# Patient Record
Sex: MALE | Race: WHITE | Employment: PART TIME | URBAN - METROPOLITAN AREA
[De-identification: names, ages, dates, MRNs, and addresses within clinical notes are randomized per-mention and may not be internally consistent; named-entity substitution may affect disease eponyms.]

---

## 2017-02-07 ENCOUNTER — HOSPITAL ENCOUNTER (OUTPATIENT)
Dept: MRI IMAGING | Age: 62
Discharge: HOME | End: 2017-02-07
Payer: MEDICARE

## 2017-02-07 DIAGNOSIS — I70.1 RENAL ARTERY STENOSIS (HCC): ICD-10-CM

## 2017-02-07 DIAGNOSIS — I10 UNCONTROLLED HYPERTENSION: ICD-10-CM

## 2017-02-07 PROCEDURE — 6360000004 HC RX CONTRAST MEDICATION: Performed by: FAMILY MEDICINE

## 2017-02-07 PROCEDURE — 74185 MRA ABD W OR W/O CNTRST: CPT | Performed by: RADIOLOGY

## 2017-02-07 PROCEDURE — A9579 GAD-BASE MR CONTRAST NOS,1ML: HCPCS | Performed by: FAMILY MEDICINE

## 2017-02-07 PROCEDURE — C8902 MRA W/O FOL W/CONT, ABD: HCPCS

## 2017-02-07 RX ADMIN — GADOPENTETATE DIMEGLUMINE 40 ML: 469.01 INJECTION INTRAVENOUS at 18:54

## 2017-10-06 ENCOUNTER — TELEPHONE (OUTPATIENT)
Dept: ADMINISTRATIVE | Age: 62
End: 2017-10-06

## 2018-06-28 ENCOUNTER — HOSPITAL ENCOUNTER (INPATIENT)
Age: 63
LOS: 2 days | Discharge: HOME | DRG: 312 | End: 2018-06-30
Attending: EMERGENCY MEDICINE | Admitting: FAMILY MEDICINE
Payer: MEDICARE

## 2018-06-28 ENCOUNTER — APPOINTMENT (OUTPATIENT)
Dept: GENERAL RADIOLOGY | Age: 63
DRG: 312 | End: 2018-06-28
Payer: MEDICARE

## 2018-06-28 DIAGNOSIS — R42 DIZZINESS: Primary | ICD-10-CM

## 2018-06-28 DIAGNOSIS — R55 PRE-SYNCOPE: ICD-10-CM

## 2018-06-28 DIAGNOSIS — R00.1 SINUS BRADYCARDIA: ICD-10-CM

## 2018-06-28 LAB
ABSOLUTE EOS #: 0.3 K/UL (ref 0–0.4)
ABSOLUTE IMMATURE GRANULOCYTE: ABNORMAL K/UL (ref 0–0.3)
ABSOLUTE LYMPH #: 2.9 K/UL (ref 1–4.8)
ABSOLUTE MONO #: 0.7 K/UL (ref 0.1–1.3)
ANION GAP SERPL CALCULATED.3IONS-SCNC: 15 MMOL/L (ref 9–17)
BASOPHILS # BLD: 2 % (ref 0–2)
BASOPHILS ABSOLUTE: 0.2 K/UL (ref 0–0.2)
BNP INTERPRETATION: ABNORMAL
BUN BLDV-MCNC: 17 MG/DL (ref 8–23)
BUN/CREAT BLD: ABNORMAL (ref 9–20)
CALCIUM SERPL-MCNC: 10.4 MG/DL (ref 8.6–10.4)
CHLORIDE BLD-SCNC: 101 MMOL/L (ref 98–107)
CO2: 24 MMOL/L (ref 20–31)
CREAT SERPL-MCNC: 0.82 MG/DL (ref 0.7–1.2)
DIFFERENTIAL TYPE: ABNORMAL
EOSINOPHILS RELATIVE PERCENT: 4 % (ref 0–4)
GFR AFRICAN AMERICAN: >60 ML/MIN
GFR NON-AFRICAN AMERICAN: >60 ML/MIN
GFR SERPL CREATININE-BSD FRML MDRD: ABNORMAL ML/MIN/{1.73_M2}
GFR SERPL CREATININE-BSD FRML MDRD: ABNORMAL ML/MIN/{1.73_M2}
GLUCOSE BLD-MCNC: 92 MG/DL (ref 70–99)
HCT VFR BLD CALC: 40.6 % (ref 41–53)
HEMOGLOBIN: 13.7 G/DL (ref 13.5–17.5)
IMMATURE GRANULOCYTES: ABNORMAL %
LYMPHOCYTES # BLD: 33 % (ref 24–44)
MAGNESIUM: 2.1 MG/DL (ref 1.6–2.6)
MCH RBC QN AUTO: 31.2 PG (ref 26–34)
MCHC RBC AUTO-ENTMCNC: 33.7 G/DL (ref 31–37)
MCV RBC AUTO: 92.5 FL (ref 80–100)
MONOCYTES # BLD: 8 % (ref 1–7)
NRBC AUTOMATED: ABNORMAL PER 100 WBC
PDW BLD-RTO: 13.2 % (ref 11.5–14.9)
PLATELET # BLD: 353 K/UL (ref 150–450)
PLATELET ESTIMATE: ABNORMAL
PMV BLD AUTO: 8.4 FL (ref 6–12)
POTASSIUM SERPL-SCNC: 3.4 MMOL/L (ref 3.7–5.3)
PRO-BNP: 304 PG/ML
RBC # BLD: 4.38 M/UL (ref 4.5–5.9)
RBC # BLD: ABNORMAL 10*6/UL
SEG NEUTROPHILS: 53 % (ref 36–66)
SEGMENTED NEUTROPHILS ABSOLUTE COUNT: 4.8 K/UL (ref 1.3–9.1)
SODIUM BLD-SCNC: 140 MMOL/L (ref 135–144)
TROPONIN INTERP: NORMAL
TROPONIN INTERP: NORMAL
TROPONIN T: <0.03 NG/ML
TROPONIN T: <0.03 NG/ML
WBC # BLD: 8.9 K/UL (ref 3.5–11)
WBC # BLD: ABNORMAL 10*3/UL

## 2018-06-28 PROCEDURE — 71046 X-RAY EXAM CHEST 2 VIEWS: CPT

## 2018-06-28 PROCEDURE — 85025 COMPLETE CBC W/AUTO DIFF WBC: CPT

## 2018-06-28 PROCEDURE — 36415 COLL VENOUS BLD VENIPUNCTURE: CPT

## 2018-06-28 PROCEDURE — G0378 HOSPITAL OBSERVATION PER HR: HCPCS

## 2018-06-28 PROCEDURE — 6360000002 HC RX W HCPCS: Performed by: FAMILY MEDICINE

## 2018-06-28 PROCEDURE — 2060000000 HC ICU INTERMEDIATE R&B

## 2018-06-28 PROCEDURE — 93005 ELECTROCARDIOGRAM TRACING: CPT

## 2018-06-28 PROCEDURE — 80048 BASIC METABOLIC PNL TOTAL CA: CPT

## 2018-06-28 PROCEDURE — 83735 ASSAY OF MAGNESIUM: CPT

## 2018-06-28 PROCEDURE — 84484 ASSAY OF TROPONIN QUANT: CPT

## 2018-06-28 PROCEDURE — 2580000003 HC RX 258: Performed by: FAMILY MEDICINE

## 2018-06-28 PROCEDURE — 6370000000 HC RX 637 (ALT 250 FOR IP): Performed by: FAMILY MEDICINE

## 2018-06-28 PROCEDURE — 99285 EMERGENCY DEPT VISIT HI MDM: CPT

## 2018-06-28 PROCEDURE — 2580000003 HC RX 258: Performed by: EMERGENCY MEDICINE

## 2018-06-28 PROCEDURE — 83880 ASSAY OF NATRIURETIC PEPTIDE: CPT

## 2018-06-28 RX ORDER — ASPIRIN 81 MG/1
81 TABLET ORAL DAILY
Status: DISCONTINUED | OUTPATIENT
Start: 2018-06-28 | End: 2018-06-30 | Stop reason: HOSPADM

## 2018-06-28 RX ORDER — CARVEDILOL 25 MG/1
25 TABLET ORAL NIGHTLY
Status: DISCONTINUED | OUTPATIENT
Start: 2018-06-28 | End: 2018-06-30 | Stop reason: HOSPADM

## 2018-06-28 RX ORDER — OXYCODONE HYDROCHLORIDE AND ACETAMINOPHEN 5; 325 MG/1; MG/1
1 TABLET ORAL EVERY 4 HOURS PRN
Status: DISCONTINUED | OUTPATIENT
Start: 2018-06-28 | End: 2018-06-30 | Stop reason: HOSPADM

## 2018-06-28 RX ORDER — SODIUM CHLORIDE 0.9 % (FLUSH) 0.9 %
10 SYRINGE (ML) INJECTION PRN
Status: DISCONTINUED | OUTPATIENT
Start: 2018-06-28 | End: 2018-06-28 | Stop reason: SDUPTHER

## 2018-06-28 RX ORDER — FUROSEMIDE 20 MG/1
20 TABLET ORAL DAILY
Status: DISCONTINUED | OUTPATIENT
Start: 2018-06-29 | End: 2018-06-29

## 2018-06-28 RX ORDER — SODIUM CHLORIDE 9 MG/ML
INJECTION, SOLUTION INTRAVENOUS CONTINUOUS
Status: DISCONTINUED | OUTPATIENT
Start: 2018-06-28 | End: 2018-06-29

## 2018-06-28 RX ORDER — DILTIAZEM HYDROCHLORIDE 180 MG/1
180 CAPSULE, COATED, EXTENDED RELEASE ORAL 2 TIMES DAILY
Status: DISCONTINUED | OUTPATIENT
Start: 2018-06-28 | End: 2018-06-28

## 2018-06-28 RX ORDER — DULOXETIN HYDROCHLORIDE 60 MG/1
60 CAPSULE, DELAYED RELEASE ORAL 2 TIMES DAILY
Status: DISCONTINUED | OUTPATIENT
Start: 2018-06-28 | End: 2018-06-30 | Stop reason: HOSPADM

## 2018-06-28 RX ORDER — HYDRALAZINE HYDROCHLORIDE 20 MG/ML
10 INJECTION INTRAMUSCULAR; INTRAVENOUS EVERY 6 HOURS PRN
Status: DISCONTINUED | OUTPATIENT
Start: 2018-06-28 | End: 2018-06-30 | Stop reason: HOSPADM

## 2018-06-28 RX ORDER — SODIUM CHLORIDE 0.9 % (FLUSH) 0.9 %
10 SYRINGE (ML) INJECTION EVERY 12 HOURS SCHEDULED
Status: DISCONTINUED | OUTPATIENT
Start: 2018-06-28 | End: 2018-06-30 | Stop reason: HOSPADM

## 2018-06-28 RX ORDER — CLONAZEPAM 1 MG/1
1 TABLET ORAL 2 TIMES DAILY
Status: DISCONTINUED | OUTPATIENT
Start: 2018-06-28 | End: 2018-06-28

## 2018-06-28 RX ORDER — SODIUM CHLORIDE 0.9 % (FLUSH) 0.9 %
10 SYRINGE (ML) INJECTION PRN
Status: DISCONTINUED | OUTPATIENT
Start: 2018-06-28 | End: 2018-06-30 | Stop reason: HOSPADM

## 2018-06-28 RX ORDER — CARVEDILOL 25 MG/1
25 TABLET ORAL NIGHTLY
COMMUNITY

## 2018-06-28 RX ORDER — 0.9 % SODIUM CHLORIDE 0.9 %
1000 INTRAVENOUS SOLUTION INTRAVENOUS ONCE
Status: COMPLETED | OUTPATIENT
Start: 2018-06-28 | End: 2018-06-28

## 2018-06-28 RX ORDER — SODIUM CHLORIDE 0.9 % (FLUSH) 0.9 %
10 SYRINGE (ML) INJECTION EVERY 12 HOURS SCHEDULED
Status: DISCONTINUED | OUTPATIENT
Start: 2018-06-28 | End: 2018-06-28 | Stop reason: SDUPTHER

## 2018-06-28 RX ORDER — HYDRALAZINE HYDROCHLORIDE 50 MG/1
50 TABLET, FILM COATED ORAL 3 TIMES DAILY
Status: DISCONTINUED | OUTPATIENT
Start: 2018-06-28 | End: 2018-06-30 | Stop reason: HOSPADM

## 2018-06-28 RX ORDER — ONDANSETRON 2 MG/ML
4 INJECTION INTRAMUSCULAR; INTRAVENOUS EVERY 6 HOURS PRN
Status: DISCONTINUED | OUTPATIENT
Start: 2018-06-28 | End: 2018-06-28 | Stop reason: SDUPTHER

## 2018-06-28 RX ORDER — ONDANSETRON 2 MG/ML
4 INJECTION INTRAMUSCULAR; INTRAVENOUS EVERY 6 HOURS PRN
Status: DISCONTINUED | OUTPATIENT
Start: 2018-06-28 | End: 2018-06-30 | Stop reason: HOSPADM

## 2018-06-28 RX ORDER — CARVEDILOL 25 MG/1
25 TABLET ORAL 2 TIMES DAILY WITH MEALS
Status: DISCONTINUED | OUTPATIENT
Start: 2018-06-28 | End: 2018-06-28

## 2018-06-28 RX ADMIN — SODIUM CHLORIDE 1000 ML: 9 INJECTION, SOLUTION INTRAVENOUS at 11:24

## 2018-06-28 RX ADMIN — HYDRALAZINE HYDROCHLORIDE 50 MG: 50 TABLET, FILM COATED ORAL at 20:25

## 2018-06-28 RX ADMIN — Medication 10 ML: at 20:24

## 2018-06-28 RX ADMIN — SODIUM CHLORIDE: 9 INJECTION, SOLUTION INTRAVENOUS at 20:27

## 2018-06-28 RX ADMIN — DULOXETINE HYDROCHLORIDE 60 MG: 60 CAPSULE, DELAYED RELEASE ORAL at 20:25

## 2018-06-28 RX ADMIN — HYDRALAZINE HYDROCHLORIDE 50 MG: 50 TABLET, FILM COATED ORAL at 17:24

## 2018-06-28 RX ADMIN — ENOXAPARIN SODIUM 30 MG: 30 INJECTION SUBCUTANEOUS at 20:24

## 2018-06-28 ASSESSMENT — ENCOUNTER SYMPTOMS
CONSTIPATION: 0
COUGH: 0
WHEEZING: 0
SHORTNESS OF BREATH: 0
EYE DISCHARGE: 0
BLURRED VISION: 0
VOMITING: 0
ABDOMINAL PAIN: 0
SORE THROAT: 0
NAUSEA: 0
DIARRHEA: 0

## 2018-06-28 NOTE — ED PROVIDER NOTES
for further workup. Patient reports already having a echocardiogram as well as carotid Dopplers ordered as an outpatient by his primary physician. DIAGNOSTIC RESULTS     EKG: All EKG's are interpreted by the Emergency Department Physician who either signs or Co-signs this chart in the absence of a cardiologist.    EKG Interpretation    Interpreted by me-11:00 AM     Rhythm: normal sinus   Rate: Bradycardic, 52  Axis: normal  Ectopy: none  Conduction: normal  ST Segments: no acute change  T Waves: no acute change  Q Waves: Nonspecific, leads 1, 2, aVL    Clinical Impression: Sinus bradycardia, nonspecific EKG    RADIOLOGY:   I directly visualized the following  images and reviewed the radiologist interpretations:  XR CHEST STANDARD (2 VW)   Final Result   No evidence for acute cardiopulmonary pathology. ED BEDSIDE ULTRASOUND:      LABS:  Labs Reviewed   CBC WITH AUTO DIFFERENTIAL - Abnormal; Notable for the following:        Result Value    RBC 4.38 (*)     Hematocrit 40.6 (*)     Monocytes 8 (*)     All other components within normal limits   BASIC METABOLIC PANEL - Abnormal; Notable for the following:     Potassium 3.4 (*)     All other components within normal limits   BRAIN NATRIURETIC PEPTIDE - Abnormal; Notable for the following:     Pro- (*)     All other components within normal limits   TROPONIN   MAGNESIUM   TROPONIN         EMERGENCY DEPARTMENT COURSE:   Vitals:    Vitals:    06/28/18 1053 06/28/18 1109 06/28/18 1150   BP: (!) 178/76  (!) 155/65   Pulse: 54 56 55   Resp: 16  16   Temp: 98.1 °F (36.7 °C)     TempSrc: Oral     SpO2: 99%  98%   Weight: 262 lb (118.8 kg)     Height: 5' 11\" (1.803 m)       12:34 PM  Workup is unremarkable. Patient remains in sinus bradycardia. Remain slightly dizzy and lightheaded with the sensation of palpitations. No PVCs noted on monitor or on EKG.   Spoke with Dr. Leif Fleming who recommended orthostatic vital signs as stated he would see the patient later today. Spoke with Dr. Satish Prado who will admit patient for symptomatic bradycardia. CRITICAL CARE:      CONSULTS:  IP CONSULT TO PRIMARY CARE PROVIDER  IP CONSULT TO CARDIOLOGY      PROCEDURES:      FINAL IMPRESSION      1. Dizziness    2. Sinus bradycardia    3.  Pre-syncope            DISPOSITION/PLAN   DISPOSITION      Admission      PATIENT REFERRED TO:  Lorna Sherman [address]  [phone]            DISCHARGE MEDICATIONS:  New Prescriptions    No medications on file       (Please note that portions of this note were completed with a voice recognition program.  Efforts were made to edit the dictations but occasionally words are mis-transcribed.)    Clay Garvey DO  Attending Emergency Physician          Clay Garvey DO  06/28/18 0746

## 2018-06-29 ENCOUNTER — APPOINTMENT (OUTPATIENT)
Dept: MRI IMAGING | Age: 63
DRG: 312 | End: 2018-06-29
Payer: MEDICARE

## 2018-06-29 LAB
ANION GAP SERPL CALCULATED.3IONS-SCNC: 12 MMOL/L (ref 9–17)
BUN BLDV-MCNC: 11 MG/DL (ref 8–23)
BUN/CREAT BLD: ABNORMAL (ref 9–20)
CALCIUM IONIZED: 1.23 MMOL/L (ref 1.13–1.33)
CALCIUM SERPL-MCNC: 8.5 MG/DL (ref 8.6–10.4)
CHLORIDE BLD-SCNC: 103 MMOL/L (ref 98–107)
CO2: 27 MMOL/L (ref 20–31)
CREAT SERPL-MCNC: 0.85 MG/DL (ref 0.7–1.2)
GFR AFRICAN AMERICAN: >60 ML/MIN
GFR NON-AFRICAN AMERICAN: >60 ML/MIN
GFR SERPL CREATININE-BSD FRML MDRD: ABNORMAL ML/MIN/{1.73_M2}
GFR SERPL CREATININE-BSD FRML MDRD: ABNORMAL ML/MIN/{1.73_M2}
GLUCOSE BLD-MCNC: 121 MG/DL (ref 70–99)
LV EF: 55 %
LVEF MODALITY: NORMAL
MAGNESIUM: 2.2 MG/DL (ref 1.6–2.6)
POTASSIUM SERPL-SCNC: 3.5 MMOL/L (ref 3.7–5.3)
SODIUM BLD-SCNC: 142 MMOL/L (ref 135–144)

## 2018-06-29 PROCEDURE — G0378 HOSPITAL OBSERVATION PER HR: HCPCS

## 2018-06-29 PROCEDURE — 80048 BASIC METABOLIC PNL TOTAL CA: CPT

## 2018-06-29 PROCEDURE — 36415 COLL VENOUS BLD VENIPUNCTURE: CPT

## 2018-06-29 PROCEDURE — 94660 CPAP INITIATION&MGMT: CPT

## 2018-06-29 PROCEDURE — 6360000004 HC RX CONTRAST MEDICATION: Performed by: PSYCHIATRY & NEUROLOGY

## 2018-06-29 PROCEDURE — 6360000002 HC RX W HCPCS: Performed by: FAMILY MEDICINE

## 2018-06-29 PROCEDURE — 93005 ELECTROCARDIOGRAM TRACING: CPT

## 2018-06-29 PROCEDURE — 93880 EXTRACRANIAL BILAT STUDY: CPT

## 2018-06-29 PROCEDURE — 6370000000 HC RX 637 (ALT 250 FOR IP): Performed by: FAMILY MEDICINE

## 2018-06-29 PROCEDURE — A9579 GAD-BASE MR CONTRAST NOS,1ML: HCPCS | Performed by: PSYCHIATRY & NEUROLOGY

## 2018-06-29 PROCEDURE — 70553 MRI BRAIN STEM W/O & W/DYE: CPT

## 2018-06-29 PROCEDURE — 93306 TTE W/DOPPLER COMPLETE: CPT

## 2018-06-29 PROCEDURE — 2060000000 HC ICU INTERMEDIATE R&B

## 2018-06-29 PROCEDURE — 2580000003 HC RX 258: Performed by: FAMILY MEDICINE

## 2018-06-29 PROCEDURE — 82330 ASSAY OF CALCIUM: CPT

## 2018-06-29 PROCEDURE — 83735 ASSAY OF MAGNESIUM: CPT

## 2018-06-29 PROCEDURE — 94762 N-INVAS EAR/PLS OXIMTRY CONT: CPT

## 2018-06-29 PROCEDURE — 6360000002 HC RX W HCPCS: Performed by: INTERNAL MEDICINE

## 2018-06-29 RX ORDER — LEVETIRACETAM 500 MG/1
500 TABLET ORAL NIGHTLY
Status: DISCONTINUED | OUTPATIENT
Start: 2018-06-29 | End: 2018-06-30 | Stop reason: HOSPADM

## 2018-06-29 RX ORDER — POTASSIUM CHLORIDE 7.45 MG/ML
10 INJECTION INTRAVENOUS PRN
Status: DISCONTINUED | OUTPATIENT
Start: 2018-06-29 | End: 2018-06-30 | Stop reason: HOSPADM

## 2018-06-29 RX ORDER — POTASSIUM CHLORIDE 20MEQ/15ML
40 LIQUID (ML) ORAL PRN
Status: DISCONTINUED | OUTPATIENT
Start: 2018-06-29 | End: 2018-06-30 | Stop reason: HOSPADM

## 2018-06-29 RX ORDER — POTASSIUM CHLORIDE 20 MEQ/1
40 TABLET, EXTENDED RELEASE ORAL PRN
Status: DISCONTINUED | OUTPATIENT
Start: 2018-06-29 | End: 2018-06-30 | Stop reason: HOSPADM

## 2018-06-29 RX ADMIN — HYDRALAZINE HYDROCHLORIDE 10 MG: 20 INJECTION INTRAMUSCULAR; INTRAVENOUS at 18:35

## 2018-06-29 RX ADMIN — SODIUM CHLORIDE: 9 INJECTION, SOLUTION INTRAVENOUS at 04:46

## 2018-06-29 RX ADMIN — POTASSIUM CHLORIDE 40 MEQ: 1500 TABLET, EXTENDED RELEASE ORAL at 08:33

## 2018-06-29 RX ADMIN — Medication 10 ML: at 19:50

## 2018-06-29 RX ADMIN — ENOXAPARIN SODIUM 30 MG: 30 INJECTION SUBCUTANEOUS at 19:50

## 2018-06-29 RX ADMIN — HYDRALAZINE HYDROCHLORIDE 10 MG: 20 INJECTION INTRAMUSCULAR; INTRAVENOUS at 07:20

## 2018-06-29 RX ADMIN — GADOTERIDOL 20 ML: 279.3 INJECTION, SOLUTION INTRAVENOUS at 15:20

## 2018-06-29 RX ADMIN — ENOXAPARIN SODIUM 30 MG: 30 INJECTION SUBCUTANEOUS at 08:21

## 2018-06-29 RX ADMIN — OXYCODONE HYDROCHLORIDE AND ACETAMINOPHEN 1 TABLET: 5; 325 TABLET ORAL at 16:52

## 2018-06-29 ASSESSMENT — PAIN SCALES - GENERAL: PAINLEVEL_OUTOF10: 4

## 2018-06-29 NOTE — H&P
Dr. Warren Tvoar                                                                       Admission Note/H&P           Patient:  Erinn Reid  YOB: 1955     MRN: [id number]                            Acct: [de-identified]      Admit date: 6/28/2018     Pt seen and Chart reviewed. Consultant notes reviewed and outpatient/ ED care evaluated.     Subjective: sees cardiol and neurol  Event monitor on 6/27, picked up event s/s syncopal like per pt  Seen neurol, has testing ordered for July ? ?EEG and others? ?  Absence events, syncope,           Patient Active Problem List   Diagnosis Code    Syncope and collapse R55         Diet:  DIET CARB CONTROL; No Added Salt (3-4 GM)        Medications:Current Inpatient     Scheduled Meds:  Scheduled Medications    aspirin  81 mg Oral Daily    DULoxetine  60 mg Oral BID    furosemide  20 mg Oral Daily    hydrALAZINE  50 mg Oral TID    sodium chloride flush  10 mL Intravenous 2 times per day    enoxaparin  30 mg Subcutaneous BID    carvedilol  25 mg Oral Nightly         Continuous Infusions:  Infusions Meds    sodium chloride 125 mL/hr at 06/29/18 0446         PRN Meds:  PRN Medications   ondansetron, oxyCODONE-acetaminophen, sodium chloride flush, magnesium hydroxide, hydrALAZINE        Objective:     Physical Exam:  Vitals: BP (!) 192/86   Pulse 57   Temp 98 °F (36.7 °C) (Axillary)   Resp 18   Ht 5' 11\" (1.803 m)   Wt 265 lb (120.2 kg)   SpO2 99%   BMI 36.96 kg/m²   Height and weight:    Wt Readings from Last 3 Encounters:   06/28/18 265 lb (120.2 kg)   10/13/16 287 lb (130.2 kg)   10/13/16 287 lb (130.2 kg)      [unfilled]     Physical Examination:   General appearance - alert, well appearing, and in no distress  Mental status - alert, oriented to person, place, and time  Chest - clear to auscultation, no wheezes, rales or rhonchi, symmetric air entry  Heart - normal rate, regular rhythm,

## 2018-06-29 NOTE — PROGRESS NOTES
Bipap on standby at this time. Pulse Ox-- 969%   Skin score--o      Nasal gel pad available at bedside. Pt awake/alert/no distress noted.

## 2018-06-29 NOTE — PROGRESS NOTES
organomegaly  Neurological - alert, oriented, normal speech, no focal findings or movement disorder noted}  Extremities - peripheral pulses normal, no pedal edema, no clubbing or cyanosis  Skin - normal coloration and turgor, no rashes, no suspicious skin lesions noted       Labs:-    CBC:   Recent Labs      06/28/18   1115   WBC  8.9   HGB  13.7   PLT  353     BMP:    Recent Labs      06/28/18   1115  06/29/18   0500   NA  140  142   K  3.4*  3.5*   CL  101  103   CO2  24  27   BUN  17  11   CREATININE  0.82  0.85   GLUCOSE  92  121*     Glucose:No results for input(s): POCGLU in the last 72 hours. HgbA1C: No results for input(s): LABA1C in the last 72 hours. INR: No results for input(s): INR in the last 72 hours. CARDIAC ENZYMES:No results for input(s): CKTOTAL, CKMB, CKMBINDEX, TROPONINI in the last 72 hours. BNP: No results for input(s): BNP in the last 72 hours. Lipids: No results for input(s): CHOL, TRIG, HDL, LDLCALC in the last 72 hours.     Invalid input(s): LDL  ABGs: No results found for: PH, PCO2, PO2, HCO3, O2SAT  Thyroid:   Lab Results   Component Value Date    TSH 2.28 01/23/2013      Urinalysis:   Clarity, UA   Date Value Ref Range Status   11/03/2016 clear  Final     Color, UA   Date Value Ref Range Status   11/03/2016 yellow  Final   09/15/2016 YELLOW YEL Final     pH, UA   Date Value Ref Range Status   11/03/2016 -  Final   09/15/2016 5.5 5.0 - 8.0 Final     Specific Gravity, UA   Date Value Ref Range Status   09/15/2016 1.027 1.000 - 1.030 Final     Spec Grav, UA   Date Value Ref Range Status   11/03/2016 -  Final     Protein, UA   Date Value Ref Range Status   09/15/2016 NEGATIVE NEG Final     Protein, UA POC   Date Value Ref Range Status   11/03/2016 neg  Final     Blood, UA POC   Date Value Ref Range Status   11/03/2016 neg  Final     Nitrite, Urine   Date Value Ref Range Status   09/15/2016 NEGATIVE NEG Final     Leukocyte Esterase, Urine   Date Value Ref Range Status   09/15/2016 NEGATIVE NEG Final     Leukocytes, UA   Date Value Ref Range Status   11/03/2016 neg  Final     Glucose, Ur   Date Value Ref Range Status   09/15/2016 NEGATIVE NEG Final     Glucose, UA POC   Date Value Ref Range Status   11/03/2016 neg  Final     Bilirubin, UA   Date Value Ref Range Status   11/03/2016 -  Final       CULTURES:    Current Rehabilitation Assessments:  PHYSICAL THERAPY:     OCCUPATIONAL THERAPY:     SPEECH:      Assessment:  Active Problems:    Syncope and collapse  Resolved Problems:    * No resolved hospital problems. *      Plan:  1. Need to find event report 6/27  2. ?EEG, CT brain done in [place] [facility] for HA resently  3.  Monitor and poss adj meds    Brandi Aguilar MD             6/29/2018, 7:55 AM

## 2018-06-29 NOTE — CONSULTS
5-6 Nerves  PROCEDURE: CARDIO Carotid Scan  Notes: Urgent cardiovascular consultation and medical consultation for syncope and orthostasis. EMG left upper extremity when above issues are completely addressed. One coated aspirin a day. Not to drive till issues of loss of consciousness episode settled. 2. Syncope, unspecified syncope type   LAB: CALCIUM  PROCEDURE: CARDIO Carotid Scan     3. Orthostasis   LAB: CALCIUM  PROCEDURE: CARDIO Carotid Scan     4. Cervical radiculopathy   LAB: CALCIUM     Follow Up   4 Weeks     History of Present Illness   ADD FU:   Patient here for eval and treat accompanied by his girlfriend. He started having problems approx eight months ago, but more recently symptoms are getting worse. He had a headache for three days and went to [facility] [place] [facility] completed- they told him it was normal. Last Oct he had left shoulder pain while on vacation went to the local urgent care one physician told him he had a stroke, another told him he had a pinched nerve, that is what started all the symtoms. Last week he started passing out, occurs when walking, and even sitting still. Headaches are left side of head, starts at his eye adn radiates to back of neck. Allen Rabago LPN. Addressed to Dr. Tyler Segundo MD.  Dear Leann Dubon,  Thank you for asking us to see 58-year-old right-handed gentleman who comes in with multiple problems. In order of severity, the first one 8 month history of episodes of loss of consciousness. He sees flashing lights early in the morning. On multiple occasions, he has had episodes where he suddenly becomes dizzy pressure in his head numbness of the upper extremities ataxia then loss of consciousness. Duration of loss of consciousness could be a few seconds. He denies incontinence or transient lateralized neurological symptoms change in speech or swallowing during these episodes. His girlfriend has witnessed multiple collapses.   He sees cardiology but has not addressed this issue with them. Recently went to [facility] severe headaches which lasted 3 days. Prostate history of migraine. Headaches accompanied with photophobia and nausea. Throbbing pain. CT brain was done but results could not be obtained patient thinks that he was told it was normal.  The reason for his visit to us he informs me is a recent history of left shoulder pain and numbness which radiates towards the left elbow and hand. He denies weakness of the left upper extremity. This started on arousal a few months ago and has persisted. No accompanied symptoms of visual discomfort facial involvement or left lower extremity involvement. The left upper extremity discomfort is painful and numb. For this problem, he has sought consultation in [place], with multiple opinions. History of kidney stones. Past history of migraine. History of hypertension, prediabetes. Nonsmoker. Works as a .  Girlfriend also reviewed reports evening jerking in unpleasant feeling of the lower extremities. Current Medications       HISTORY OF PRESENT ILLNESS:                 The patient is a 60 y.o. male who presents with     Allergies:  Lisinopril and Risperdal [risperidone]    Current Medications:   Scheduled Meds:   aspirin  81 mg Oral Daily    DULoxetine  60 mg Oral BID    furosemide  20 mg Oral Daily    hydrALAZINE  50 mg Oral TID    sodium chloride flush  10 mL Intravenous 2 times per day    enoxaparin  30 mg Subcutaneous BID    carvedilol  25 mg Oral Nightly     Continuous Infusions:   sodium chloride 125 mL/hr at 06/29/18 0446     PRN Meds:.potassium chloride **OR** potassium chloride **OR** potassium chloride, ondansetron, oxyCODONE-acetaminophen, sodium chloride flush, magnesium hydroxide, hydrALAZINE     REVIEW OF SYSTEMS:       With a chief complaint of presyncope.   Patient states that earlier this morning he was at work working on a machine when he got lightheaded and felt like he was going to MEDICATIONS             Previous Medications     ASPIRIN 81 MG TABLET    Take 81 mg by mouth daily Indications: three days a week      CARVEDILOL (COREG) 25 MG TABLET    Take 25 mg by mouth 2 times daily (with meals) PCP just changed this to nightly, last dose 18 am     CLONAZEPAM (KLONOPIN) 1 MG TABLET    Take 1 mg by mouth nightly as needed     DILTIAZEM (CARDIZEM CD) 180 MG EXTENDED RELEASE CAPSULE    Take 180 mg by mouth 2 times daily     DULOXETINE (CYMBALTA) 60 MG CAPSULE    Take 60 mg by mouth 2 times daily      FUROSEMIDE (LASIX) 20 MG TABLET    take 1 tablet by mouth once daily     HYDRALAZINE (APRESOLINE) 50 MG TABLET    Take 1 tablet by mouth 3 times daily     IBUPROFEN (ADVIL;MOTRIN) 800 MG TABLET    Take 1 tablet by mouth every 8 hours as needed for Pain     OXYCODONE-ACETAMINOPHEN (PERCOCET) 5-325 MG PER TABLET    Take 1 tablet by mouth as needed         ALLERGIES     is allergic to lisinopril and risperdal [risperidone].     FAMILY HISTORY     indicated that his mother is . He indicated that his father is . He indicated that his sister is alive. He indicated that his brother is alive. He indicated that his maternal grandfather is . He indicated that his paternal grandfather is . family history includes Cancer in his father, maternal grandfather, mother, and paternal grandfather; Diabetes in his brother; Heart Disease in his brother and mother; High Blood Pressure in his brother, mother, and sister; Kidney Disease in his mother; Other in his sister.     SOCIAL HISTORY      reports that he has never smoked.  He has never used smokeless tobacco. He reports that he does not drink alcohol or use drugs.           PHYSICAL EXAM:       BP (!) 173/83   Pulse 68   Temp 98.2 °F (36.8 °C) (Oral)   Resp 18   Ht 5' 11\" (1.803 m)   Wt 265 lb (120.2 kg)   SpO2 99%   BMI 36.96 kg/m²       LABS AND IMAGING:       Admission on 2018   Component Date Value Ref Range Status    Ventricular Rate 06/28/2018 52  BPM Preliminary    Atrial Rate 06/28/2018 52  BPM Preliminary    P-R Interval 06/28/2018 168  ms Preliminary    QRS Duration 06/28/2018 112  ms Preliminary    Q-T Interval 06/28/2018 454  ms Preliminary    QTc Calculation (Bazett) 06/28/2018 422  ms Preliminary    P Axis 06/28/2018 -165  degrees Preliminary    R Axis 06/28/2018 176  degrees Preliminary    T Axis 06/28/2018 119  degrees Preliminary    WBC 06/28/2018 8.9  3.5 - 11.0 k/uL Final    RBC 06/28/2018 4.38* 4.5 - 5.9 m/uL Final    Hemoglobin 06/28/2018 13.7  13.5 - 17.5 g/dL Final    Hematocrit 06/28/2018 40.6* 41 - 53 % Final    MCV 06/28/2018 92.5  80 - 100 fL Final    MCH 06/28/2018 31.2  26 - 34 pg Final    MCHC 06/28/2018 33.7  31 - 37 g/dL Final    RDW 06/28/2018 13.2  11.5 - 14.9 % Final    Platelets 30/40/0586 353  150 - 450 k/uL Final    MPV 06/28/2018 8.4  6.0 - 12.0 fL Final    NRBC Automated 06/28/2018 NOT REPORTED  per 100 WBC Final    Differential Type 06/28/2018 NOT REPORTED   Final    Seg Neutrophils 06/28/2018 53  36 - 66 % Final    Lymphocytes 06/28/2018 33  24 - 44 % Final    Monocytes 06/28/2018 8* 1 - 7 % Final    Eosinophils % 06/28/2018 4  0 - 4 % Final    Basophils 06/28/2018 2  0 - 2 % Final    Immature Granulocytes 06/28/2018 NOT REPORTED  0 % Final    Segs Absolute 06/28/2018 4.80  1.3 - 9.1 k/uL Final    Absolute Lymph # 06/28/2018 2.90  1.0 - 4.8 k/uL Final    Absolute Mono # 06/28/2018 0.70  0.1 - 1.3 k/uL Final    Absolute Eos # 06/28/2018 0.30  0.0 - 0.4 k/uL Final    Basophils # 06/28/2018 0.20  0.0 - 0.2 k/uL Final    Absolute Immature Granulocyte 06/28/2018 NOT REPORTED  0.00 - 0.30 k/uL Final    WBC Morphology 06/28/2018 NOT REPORTED   Final    RBC Morphology 06/28/2018 NOT REPORTED   Final    Platelet Estimate 93/37/3720 NOT REPORTED   Final    Glucose 06/28/2018 92  70 - 99 mg/dL Final    BUN 06/28/2018 17  8 - 23 mg/dL Final    CREATININE 06/28/2018 0.82  0.70 - 1.20 mg/dL Final    Bun/Cre Ratio 06/28/2018 NOT REPORTED  9 - 20 Final    Calcium 06/28/2018 10.4  8.6 - 10.4 mg/dL Final    Sodium 06/28/2018 140  135 - 144 mmol/L Final    Potassium 06/28/2018 3.4* 3.7 - 5.3 mmol/L Final    Chloride 06/28/2018 101  98 - 107 mmol/L Final    CO2 06/28/2018 24  20 - 31 mmol/L Final    Anion Gap 06/28/2018 15  9 - 17 mmol/L Final    GFR Non- 06/28/2018 >60  >60 mL/min Final    GFR  06/28/2018 >60  >60 mL/min Final    GFR Comment 06/28/2018        Final    Comment: Average GFR for 61-76 years old:   80 mL/min/1.73sq m  Chronic Kidney Disease:   <60 mL/min/1.73sq m  Kidney failure:   <15 mL/min/1.73sq m              eGFR calculated using average adult body mass. Additional eGFR calculator   available at:        [employer]            GFR Staging 06/28/2018 NOT REPORTED   Final    Pro-BNP 06/28/2018 304* <300 pg/mL Final     Pro-BNP results cannot be compared to BNP results.  BNP Interpretation 06/28/2018        Final    Comment: Pro-BNP Reference Range:        Rule Out:    <300        [place]:   Age <50     300-450   Age 54-65   300-900   Age >75     300-1800  Usually represents mild to moderate HF but other cardiopulmonary causes cannot   be ruled out. Rule In:   Age <50     >450   Age 54-65   >900   Age >76    >5      Troponin T 06/28/2018 <0.03  <0.03 ng/mL Final    Troponin T results cannot be compared to Troponin-I results.  Troponin Interp 06/28/2018        Final    Comment: Reference Range:   <0.03     Within reference range. 0.03-0.09 Possible myocardial damage. Repeat at appropriate intervals to rule out chronic elevation.   >= 0.10   Indicative of myocardial damage. Patients with high levels of Biotin oral intake (i.e >5mg/day) may have falsely   decreased Troponin T levels.  Samples collected within 8 hours of biotin intake   may require additional information for diagnosis.  Troponin T 06/28/2018 <0.03  <0.03 ng/mL Final    Troponin T results cannot be compared to Troponin-I results.  Troponin Interp 06/28/2018        Final    Comment: Reference Range:   <0.03     Within reference range. 0.03-0.09 Possible myocardial damage. Repeat at appropriate intervals to rule out chronic elevation.   >= 0.10   Indicative of myocardial damage. Patients with high levels of Biotin oral intake (i.e >5mg/day) may have falsely   decreased Troponin T levels. Samples collected within 8 hours of biotin intake   may require additional information for diagnosis.  Magnesium 06/28/2018 2.1  1.6 - 2.6 mg/dL Final    Glucose 06/29/2018 121* 70 - 99 mg/dL Final    BUN 06/29/2018 11  8 - 23 mg/dL Final    CREATININE 06/29/2018 0.85  0.70 - 1.20 mg/dL Final    Bun/Cre Ratio 06/29/2018 NOT REPORTED  9 - 20 Final    Calcium 06/29/2018 8.5* 8.6 - 10.4 mg/dL Final    Sodium 06/29/2018 142  135 - 144 mmol/L Final    Potassium 06/29/2018 3.5* 3.7 - 5.3 mmol/L Final    Chloride 06/29/2018 103  98 - 107 mmol/L Final    CO2 06/29/2018 27  20 - 31 mmol/L Final    Anion Gap 06/29/2018 12  9 - 17 mmol/L Final    GFR Non- 06/29/2018 >60  >60 mL/min Final    GFR  06/29/2018 >60  >60 mL/min Final    GFR Comment 06/29/2018        Final    Comment: Average GFR for 61-76 years old:   80 mL/min/1.73sq m  Chronic Kidney Disease:   <60 mL/min/1.73sq m  Kidney failure:   <15 mL/min/1.73sq m              eGFR calculated using average adult body mass. Additional eGFR calculator   available at:        [email or web address]            GFR Staging 06/29/2018 NOT REPORTED   Final       Radiology Review:      ASSESSMENT AND PLAN:       Patient Active Problem List   Diagnosis    Syncope and collapse         Eliane Heller M.D.   Neurology  6/29/2018  10:32 AM

## 2018-06-29 NOTE — CONSULTS
name: N/A    Number of children: N/A    Years of education: N/A     Occupational History    Not on file. Social History Main Topics    Smoking status: Never Smoker    Smokeless tobacco: Never Used    Alcohol use No    Drug use: No    Sexual activity: Not on file     Other Topics Concern    Not on file     Social History Narrative    No narrative on file      Family History:   Family History   Problem Relation Age of Onset    Kidney Disease Mother     Cancer Mother     Heart Disease Mother     High Blood Pressure Mother     Cancer Father     High Blood Pressure Sister     Other Sister         ptsd    High Blood Pressure Brother     Heart Disease Brother     Diabetes Brother     Cancer Maternal Grandfather     Cancer Paternal Grandfather        Review of Systems:   · Constitutional: No Fevers/Chills, No recent weight gain weight loss, No fatigue. · Eyes: No visual changes or diplopia. · ENT: No Headaches, hearing loss or vertigo. · Cardiovascular: Per HPI  · Respiratory: Shortness of breath   · Gastrointestinal: No Nausea, Vomiting, Diarrhea, or Constipation  · Genitourinary: No dysuria,hematuria. · Musculoskeletal:  No gait disturbance, weakness or joint complaints. · Integumentary: No rash or pruritis. · Neurological: Lightheadedness and near syncope and recurrent falling  · Psychiatric: No anxiety, or depression. · Endocrine: No temperature intolerance. · Hematologic/Lymphatic: No abnormal bruising or bleeding     Physical Exam   Vital Signs: BP (!) 173/87   Pulse 63   Temp 98 °F (36.7 °C) (Oral)   Resp 16   Ht 5' 11\" (1.803 m)   Wt 265 lb (120.2 kg)   SpO2 98%   BMI 36.96 kg/m²        Admission Weight: 262 lb (118.8 kg)     General appearance: Alert oriented and cooperative. In no acute distress obese    Skin:  Warm and Dry to touch    Head: Normocephalic, without obvious abnormality, atraumatic    Eyes: Conjunctivae unremarkable, EOM's intact.     Neck: No JVD, No

## 2018-06-29 NOTE — FLOWSHEET NOTE
06/28/18 2121   Provider Notification   Reason for Communication Evaluate   Provider Name Dr. José Luis Ortiz   Provider Notification Physician   Method of Communication Call   Response See orders   Notification Time 2120   Dr. José Luis Ortiz notified of patient's home BiPap use. Also updated on labs from ER. Orders received.

## 2018-06-29 NOTE — PROGRESS NOTES
Placed patient on bipap for the night, initial settings 12/6 @ 14 with Fio2 of 30%. Breath sounds diminished, spo2 98%, no distress noted at this time.

## 2018-06-30 VITALS
SYSTOLIC BLOOD PRESSURE: 168 MMHG | BODY MASS INDEX: 37.1 KG/M2 | WEIGHT: 265 LBS | RESPIRATION RATE: 18 BRPM | HEIGHT: 71 IN | HEART RATE: 58 BPM | TEMPERATURE: 98.3 F | OXYGEN SATURATION: 99 % | DIASTOLIC BLOOD PRESSURE: 88 MMHG

## 2018-06-30 PROCEDURE — 6370000000 HC RX 637 (ALT 250 FOR IP): Performed by: FAMILY MEDICINE

## 2018-06-30 PROCEDURE — 2580000003 HC RX 258: Performed by: FAMILY MEDICINE

## 2018-06-30 PROCEDURE — 6360000002 HC RX W HCPCS: Performed by: FAMILY MEDICINE

## 2018-06-30 RX ORDER — DILTIAZEM HYDROCHLORIDE 180 MG/1
180 CAPSULE, COATED, EXTENDED RELEASE ORAL 2 TIMES DAILY
Qty: 30 CAPSULE | Refills: 0 | Status: SHIPPED | OUTPATIENT
Start: 2018-06-30

## 2018-06-30 RX ADMIN — Medication 10 ML: at 09:03

## 2018-06-30 RX ADMIN — ENOXAPARIN SODIUM 30 MG: 30 INJECTION SUBCUTANEOUS at 09:03

## 2018-06-30 RX ADMIN — OXYCODONE HYDROCHLORIDE AND ACETAMINOPHEN 1 TABLET: 5; 325 TABLET ORAL at 08:57

## 2018-06-30 ASSESSMENT — PAIN SCALES - GENERAL
PAINLEVEL_OUTOF10: 0
PAINLEVEL_OUTOF10: 4

## 2018-06-30 NOTE — DISCHARGE SUMMARY
Physician Discharge Summary     Patient ID:  Tiffanie Walls  [id number]  89 y.o.  1955    Admit date: 6/28/2018    Discharge date and time: No discharge date for patient encounter. Admitting Physician: Nely Chandra DO     Discharge Physician: Primitivo Lambert DO  Discharge date 6/30/18    Admission Diagnoses:   Syncope and collapse [R55]  Syncope and collapse [R55]    Discharge Diagnoses: Active Problems:    Syncope and collapse  Resolved Problems:    * No resolved hospital problems. *    Vasovagal syncope and orthostatic hypotension  Hospital Course: Patient admitted with episode of syncopal episode and near syncope was seen by cardiology and neurology evaluated his labs and workup was negative. Was discharged home to be followed outpatient. He has outpatient cardiology workup already. Cardiology felt he could go home. To wear his elastic stocking and small rise when he gets up and move for right now. His Lasix was taken away from by cardiology . Neurology wanted to start him on Keppra the patient declined taking it just because she isn't particularly aggravated his bipolar disorder    Patient Instructions: See list  Condition on discharge stable  Disposition patient discharged to home  Discharge Medications:  [unfilled]    Activity: Ad sandeep. Diet: DIET CARB CONTROL; No Added Salt (3-4 GM)    Follow-up with Dr Markel Ball in 1 to 2 weeks, patient to call  for an appointment and if has any problems.       Electronically signed by Primitivo Lambert DO  on 6/30/2018 at 11:24 AM

## 2018-06-30 NOTE — CARE COORDINATION
ONGOING DISCHARGE PLAN:    Spoke with patient regarding discharge plan and patient confirms that plan is still home. The patient continues to decline VNS and home discharge needs. Discharge order is in for the patient. Will continue to follow for additional discharge needs.     Electronically signed by Jb Lovett RN on 6/30/2018 at 11:41 AM

## 2018-06-30 NOTE — PROGRESS NOTES
Just because                                                                     [id number] [address]      PROGRESS NOTE        Patient:  Sloan Lilly  YOB: 1955    MRN: [id number]     Acct: [de-identified]     Admit date: 6/28/2018    Pt seen and Chart reviewed. Consultant notes reviewed and care evaluated. Subjective: Patient feels okay. He does not feel dizziness moving in the room but he states his orthostatic blood pressure did drop in the last time they checked him about 40 points. He was giving prescription for Keppra from the neurology he refused it last night because he heard about the side effects could aggravate his bipolar and he doesn't want to affect that he feels has been stable with that. Discussed with him about medication including ProAmatine but the staff tells me his blood pressure runs in the 160s 180 sitting. He does have elastic stocking at home    Diet:  DIET CARB CONTROL; No Added Salt (3-4 GM)      Medications:Current Inpatient nausea    Scheduled Meds:   levETIRAcetam  500 mg Oral Nightly    aspirin  81 mg Oral Daily    DULoxetine  60 mg Oral BID    hydrALAZINE  50 mg Oral TID    sodium chloride flush  10 mL Intravenous 2 times per day    enoxaparin  30 mg Subcutaneous BID    carvedilol  25 mg Oral Nightly     Continuous Infusions:  PRN Meds:potassium chloride **OR** potassium chloride **OR** potassium chloride, ondansetron, oxyCODONE-acetaminophen, sodium chloride flush, magnesium hydroxide, hydrALAZINE        Physical Exam:  Vitals: BP (!) 158/108   Pulse 56   Temp 97.7 °F (36.5 °C) (Oral)   Resp 20   Ht 5' 11\" (1.803 m)   Wt 265 lb (120.2 kg)   SpO2 99%   BMI 36.96 kg/m²   24 hour intake/output:  Intake/Output Summary (Last 24 hours) at 06/30/18 1053  Last data filed at 06/30/18 0745   Gross per 24 hour   Intake                0 ml   Output             2450 ml   Net            -2450 ml     Last 3 weights:   Wt Readings from Last 3 Encounters:   06/28/18 265 lb (120.2 kg)   10/13/16 287 lb (130.2 kg)   10/13/16 287 lb (130.2 kg)       Physical Examination:   General appearance - alert, well appearing, and in no distress  Mental status - alert, oriented to person, place, and time  PERRLA wnl  Chest - clear to auscultation, no wheezes, rales or rhonchi, symmetric air entry  Heart - normal rate, regular rhythm, normal S1, S2, no murmurs, rubs, clicks or gallops  Abdomen - soft, nontender, nondistended, no masses or organomegaly  Neurological - alert, oriented, normal speech, no focal findings or movement disorder noted}  Extremities - peripheral pulses normal, no pedal edema, no clubbing or cyanosis  Skin - normal coloration and turgor, no rashes, no suspicious skin lesions noted   Labs rev  Carotid scan rev      Assessment:  Active Problems:    Syncope and collapse  Resolved Problems:    * No resolved hospital problems. *  Vasovagal and orthostatic hypotension    Hypertension    History of bipolar disorder stable on Cymbalta  belle uses cpap  Plan:  1. We'll await cardiology input discussed with DR. Balderas   If  patient is okay from their standpoint he might be discharged this afternoon started patient to do slow rise from sitting he is to use his elastic stocking. 2.   3. Consideration for ProAmatine but his blood pressure medicines and 160s to 180s when he sitting down. We'll hold on that for now and see just conservative treatment would help. 4.   5. Patient doesn't want to take Road because he is afraid that will aggravate his bipolar since it's been stable for a while.      Temple HillsDO             6/30/2018, 10:53 AM

## 2018-06-30 NOTE — CARE COORDINATION
Patient discharged home. Discharge and follow up instructions reviewed with patient. Patient denies questions. Voices understanding. Patient refused written script for Loki Sorto stating he has not had a seizure.

## 2018-06-30 NOTE — PROGRESS NOTES
[facility] Progress Note    2018 10:59 AM      Subjective:  Mr. Daniel White  denies any chest pain or shortness of breath or palpitations or lightheadedness or dizziness or fatigue or syncope. I am seeing him for the 1st time this admission. Reviewed previous notes. LABS:     Recent Results (from the past 24 hour(s))   Calcium, Ionized    Collection Time: 18  1:11 PM   Result Value Ref Range    Calcium, Ion 1.23 1.13 - 1.33 mmol/L   Magnesium    Collection Time: 18  1:11 PM   Result Value Ref Range    Magnesium 2.2 1.6 - 2.6 mg/dL   EKG 12 Lead    Collection Time: 18  4:03 PM   Result Value Ref Range    Ventricular Rate 59 BPM    Atrial Rate 59 BPM    P-R Interval 184 ms    QRS Duration 104 ms    Q-T Interval 440 ms    QTc Calculation (Bazett) 435 ms    P Axis 51 degrees    R Axis 12 degrees    T Axis 65 degrees       Pulse Ox: SpO2  Av %  Min: 97 %  Max: 99 %    Supplemental O2:       Current Meds:    levETIRAcetam  500 mg Oral Nightly    aspirin  81 mg Oral Daily    DULoxetine  60 mg Oral BID    hydrALAZINE  50 mg Oral TID    sodium chloride flush  10 mL Intravenous 2 times per day    enoxaparin  30 mg Subcutaneous BID    carvedilol  25 mg Oral Nightly          VITAL SIGNS:    BP (!) 158/108   Pulse 56   Temp 97.7 °F (36.5 °C) (Oral)   Resp 20   Ht 5' 11\" (1.803 m)   Wt 265 lb (120.2 kg)   SpO2 99%   BMI 36.96 kg/m²         Admit Weight:  262 lb (118.8 kg)    Last 3 weights: Wt Readings from Last 3 Encounters:   18 265 lb (120.2 kg)   10/13/16 287 lb (130.2 kg)   10/13/16 287 lb (130.2 kg)       BMI: Body mass index is 36.96 kg/m². INPUT/OUTPUT:        Intake/Output Summary (Last 24 hours) at 18 1059  Last data filed at 18 0745   Gross per 24 hour   Intake                0 ml   Output             2450 ml   Net            -2450 ml       Telemetry shows sinus rhythm.     EXAM:     General appearance: awake,

## 2018-06-30 NOTE — PLAN OF CARE
Problem: Falls - Risk of:  Goal: Will remain free from falls  Will remain free from falls   Outcome: Met This Shift  Pt assessed as a fall risk this shift. Remains free from falls and accidental injury at this time. Fall precautions in place, including falling star sign and fall risk band on pt. Floor free from obstacles, and bed is locked and in lowest position. Adequate lighting provided. Pt encouraged to call before getting OOB for any need. Bed alarm activated. Will continue to monitor needs during hourly rounding, and reinforce education on use of call light.         Problem: Cardiac:  Goal: Ability to maintain vital signs within normal range will improve  Ability to maintain vital signs within normal range will improve   Outcome: Ongoing  Vitals:    06/29/18 1400 06/29/18 1829 06/29/18 2024 06/29/18 2337   BP: (!) 173/87 (!) 203/100  Comment: PRN medication administered  139/64   Pulse: 63 57 57 55   Resp: 16 16 18 16   Temp: 98 °F (36.7 °C) 97.8 °F (36.6 °C)  96.8 °F (36 °C)   TempSrc: Oral Oral  Axillary   SpO2: 98% 98% 98%  Comment: room air 97%   Weight:       Height:

## 2018-06-30 NOTE — DISCHARGE INSTR - DIET

## 2018-07-01 LAB
EKG ATRIAL RATE: 52 BPM
EKG ATRIAL RATE: 59 BPM
EKG P AXIS: -165 DEGREES
EKG P AXIS: 51 DEGREES
EKG P-R INTERVAL: 168 MS
EKG P-R INTERVAL: 184 MS
EKG Q-T INTERVAL: 440 MS
EKG Q-T INTERVAL: 454 MS
EKG QRS DURATION: 104 MS
EKG QRS DURATION: 112 MS
EKG QTC CALCULATION (BAZETT): 422 MS
EKG QTC CALCULATION (BAZETT): 435 MS
EKG R AXIS: 12 DEGREES
EKG R AXIS: 176 DEGREES
EKG T AXIS: 119 DEGREES
EKG T AXIS: 65 DEGREES
EKG VENTRICULAR RATE: 52 BPM
EKG VENTRICULAR RATE: 59 BPM

## 2018-07-04 NOTE — PROGRESS NOTES
..Date Patient Discharged:6-30-18      Today's AW:9-5-33      Spoke with:Patient      Do you understand the purpose of your medications? :yes      Do you understand the side effects of your new medications? :yes      Would you like to speak with a pharmacist about any of your medications or the side effects?:no    Were you able to get your prescriptions filled?:yes      Did you understand your discharge instructions and what you are responsible for in managing your health after discharge?:yes      While you were here, was your call light answered promptly?:yes      Was the area around your room kept quiet at night?:yes      Were your room and bathroom kept clean?:yes

## 2018-07-06 NOTE — PROGRESS NOTES
Date Patient Discharged:  6/30/18    Today's Date:  7/6/2018    Spoke with:  Patient    Do you understand the purpose of your medications?:  Yes   Do you understand the side effects of your new medications?:  yes    Would you like to speak with a pharmacist about any of your medications or the side effects?:  no    Were you able to get your prescriptions filled?:  Yes    Did you understand your discharge instructions and what you are responsible for in managing your health after discharge?:yes      While you were here, was your call light answered promptly?:  yes    Was the area around your room kept quiet at night?:  NO noisy at night     Were your room and bathroom kept clean?:  yes

## 2019-07-15 ENCOUNTER — HOSPITAL ENCOUNTER (EMERGENCY)
Age: 64
Discharge: HOME | End: 2019-07-15
Attending: EMERGENCY MEDICINE
Payer: MEDICARE

## 2019-07-15 VITALS
DIASTOLIC BLOOD PRESSURE: 90 MMHG | OXYGEN SATURATION: 95 % | WEIGHT: 265 LBS | SYSTOLIC BLOOD PRESSURE: 142 MMHG | HEIGHT: 71 IN | HEART RATE: 61 BPM | RESPIRATION RATE: 16 BRPM | TEMPERATURE: 97.8 F | BODY MASS INDEX: 37.1 KG/M2

## 2019-07-15 DIAGNOSIS — R55 SYNCOPE AND COLLAPSE: Primary | ICD-10-CM

## 2019-07-15 LAB
ABSOLUTE EOS #: 0.2 K/UL (ref 0–0.4)
ABSOLUTE IMMATURE GRANULOCYTE: ABNORMAL K/UL (ref 0–0.3)
ABSOLUTE LYMPH #: 2.3 K/UL (ref 1–4.8)
ABSOLUTE MONO #: 0.6 K/UL (ref 0.1–1.3)
ANION GAP SERPL CALCULATED.3IONS-SCNC: 16 MMOL/L (ref 9–17)
BASOPHILS # BLD: 1 % (ref 0–2)
BASOPHILS ABSOLUTE: 0.1 K/UL (ref 0–0.2)
BUN BLDV-MCNC: 38 MG/DL (ref 8–23)
BUN/CREAT BLD: ABNORMAL (ref 9–20)
CALCIUM SERPL-MCNC: 10.1 MG/DL (ref 8.6–10.4)
CHLORIDE BLD-SCNC: 101 MMOL/L (ref 98–107)
CO2: 21 MMOL/L (ref 20–31)
CREAT SERPL-MCNC: 1.09 MG/DL (ref 0.7–1.2)
DIFFERENTIAL TYPE: ABNORMAL
EOSINOPHILS RELATIVE PERCENT: 2 % (ref 0–4)
GFR AFRICAN AMERICAN: >60 ML/MIN
GFR NON-AFRICAN AMERICAN: >60 ML/MIN
GFR SERPL CREATININE-BSD FRML MDRD: ABNORMAL ML/MIN/{1.73_M2}
GFR SERPL CREATININE-BSD FRML MDRD: ABNORMAL ML/MIN/{1.73_M2}
GLUCOSE BLD-MCNC: 146 MG/DL (ref 70–99)
HCT VFR BLD CALC: 35.7 % (ref 41–53)
HEMOGLOBIN: 12.3 G/DL (ref 13.5–17.5)
IMMATURE GRANULOCYTES: ABNORMAL %
LYMPHOCYTES # BLD: 23 % (ref 24–44)
MCH RBC QN AUTO: 32.9 PG (ref 26–34)
MCHC RBC AUTO-ENTMCNC: 34.5 G/DL (ref 31–37)
MCV RBC AUTO: 95.3 FL (ref 80–100)
MONOCYTES # BLD: 7 % (ref 1–7)
NRBC AUTOMATED: ABNORMAL PER 100 WBC
PDW BLD-RTO: 12.9 % (ref 11.5–14.9)
PLATELET # BLD: 332 K/UL (ref 150–450)
PLATELET ESTIMATE: ABNORMAL
PMV BLD AUTO: 7.7 FL (ref 6–12)
POTASSIUM SERPL-SCNC: 3.1 MMOL/L (ref 3.7–5.3)
RBC # BLD: 3.74 M/UL (ref 4.5–5.9)
RBC # BLD: ABNORMAL 10*6/UL
SEG NEUTROPHILS: 67 % (ref 36–66)
SEGMENTED NEUTROPHILS ABSOLUTE COUNT: 6.6 K/UL (ref 1.3–9.1)
SODIUM BLD-SCNC: 138 MMOL/L (ref 135–144)
TROPONIN INTERP: NORMAL
TROPONIN INTERP: NORMAL
TROPONIN T: NORMAL NG/ML
TROPONIN T: NORMAL NG/ML
TROPONIN, HIGH SENSITIVITY: 16 NG/L (ref 0–22)
TROPONIN, HIGH SENSITIVITY: 19 NG/L (ref 0–22)
WBC # BLD: 9.8 K/UL (ref 3.5–11)
WBC # BLD: ABNORMAL 10*3/UL

## 2019-07-15 PROCEDURE — 36415 COLL VENOUS BLD VENIPUNCTURE: CPT

## 2019-07-15 PROCEDURE — 85025 COMPLETE CBC W/AUTO DIFF WBC: CPT

## 2019-07-15 PROCEDURE — 99284 EMERGENCY DEPT VISIT MOD MDM: CPT

## 2019-07-15 PROCEDURE — 84484 ASSAY OF TROPONIN QUANT: CPT

## 2019-07-15 PROCEDURE — 80048 BASIC METABOLIC PNL TOTAL CA: CPT

## 2019-07-15 ASSESSMENT — PAIN SCALES - GENERAL: PAINLEVEL_OUTOF10: 2

## 2019-07-15 ASSESSMENT — PAIN DESCRIPTION - LOCATION: LOCATION: HEAD

## 2019-07-15 ASSESSMENT — PAIN DESCRIPTION - PAIN TYPE: TYPE: ACUTE PAIN

## 2019-07-15 ASSESSMENT — PAIN DESCRIPTION - DESCRIPTORS: DESCRIPTORS: ACHING

## 2019-07-15 NOTE — ED PROVIDER NOTES
history, past surgical history, allergies, medications, social and family history as documented unless otherwise noted below. Documentation of the HPI, Physical Exam and Medical Decision Making performed by medical students or scribes is based on my personal performance of the HPI, PE and MDM. I personally evaluated and examined the patient in conjunction with the APC and agree with the assessment, treatment plan, and disposition of the patient as recorded by the APC. Additional findings are as noted.     Tyesha Stephenson MD  Attending Emergency  Physician              Denice Worthy MD  07/15/19 [id number]

## 2019-07-16 ASSESSMENT — ENCOUNTER SYMPTOMS
SHORTNESS OF BREATH: 0
BACK PAIN: 0
VOMITING: 0
NAUSEA: 0
ABDOMINAL PAIN: 0

## 2024-02-12 NOTE — PLAN OF CARE
Problem: Falls - Risk of:  Goal: Will remain free from falls  Will remain free from falls   Outcome: Met This Shift  No falls since admission to the floor. Pt A+Ox4, able to make needs known  Goal: Absence of physical injury  Absence of physical injury   Outcome: Met This Shift  No injuries this shift    Problem: Cardiac:  Goal: Ability to maintain vital signs within normal range will improve  Ability to maintain vital signs within normal range will improve   Outcome: Ongoing  (+) orthostatic BP, no dizziness or light headedness during BP checks, nor since admission to the floor.  Remains SB in the 50's 3 = A little assistance